# Patient Record
Sex: MALE | Race: WHITE | URBAN - NONMETROPOLITAN AREA
[De-identification: names, ages, dates, MRNs, and addresses within clinical notes are randomized per-mention and may not be internally consistent; named-entity substitution may affect disease eponyms.]

---

## 2021-10-11 ENCOUNTER — IMPORTED ENCOUNTER (OUTPATIENT)
Dept: URBAN - NONMETROPOLITAN AREA CLINIC 1 | Facility: CLINIC | Age: 71
End: 2021-10-11

## 2021-10-11 PROBLEM — H35.363: Noted: 2021-10-11

## 2021-10-11 PROBLEM — H52.4: Noted: 2021-10-11

## 2021-10-11 PROBLEM — H25.13: Noted: 2021-10-11

## 2021-10-11 PROBLEM — H52.03: Noted: 2021-10-11

## 2021-10-11 PROBLEM — H52.223: Noted: 2021-10-11

## 2021-10-11 PROCEDURE — 92015 DETERMINE REFRACTIVE STATE: CPT

## 2021-10-11 PROCEDURE — 92004 COMPRE OPH EXAM NEW PT 1/>: CPT

## 2021-10-11 NOTE — PATIENT DISCUSSION
Compound Hyperopic Astigmatism OU w/Presbyopia -  discussed findings w/patient -  new spectacle Rx issued -  continue to monitor Cataracts OU -  discussed findings w/patient -  discussed signs and symptoms associated -  recommend UV protection -  no treatment indicated at this time -  continue to monitor Drusen OU -  discussed findings w/patient -  discussed signs and symptoms associated -  patient to call or come in ASAP if any changes in vision noted from today -  order mac OCT at next visit -  continue to monitor; Dr's Notes:  10/11/2021DFE 10/11/2021mac OCT

## 2022-04-09 ASSESSMENT — TONOMETRY
OS_IOP_MMHG: 18
OD_IOP_MMHG: 18

## 2022-04-09 ASSESSMENT — VISUAL ACUITY
OU_SC: 20/20
OS_SC: 20/20
OU_CC: 20/20
OD_SC: 20/20

## 2022-10-21 NOTE — PATIENT DISCUSSION
symptoms consistent with fluctuations in BP and BS. Keep FU PCP for eval and tx cardiovascular risk factors.

## 2022-11-29 ENCOUNTER — EMERGENCY VISIT (OUTPATIENT)
Dept: URBAN - NONMETROPOLITAN AREA CLINIC 4 | Facility: CLINIC | Age: 72
End: 2022-11-29

## 2022-11-29 DIAGNOSIS — H00.11: ICD-10-CM

## 2022-11-29 PROCEDURE — 92012 INTRM OPH EXAM EST PATIENT: CPT

## 2022-11-29 ASSESSMENT — VISUAL ACUITY
OS_CC: 20/25
OD_CC: 20/30

## 2022-12-07 ENCOUNTER — FOLLOW UP (OUTPATIENT)
Dept: URBAN - NONMETROPOLITAN AREA CLINIC 4 | Facility: CLINIC | Age: 72
End: 2022-12-07

## 2022-12-07 PROCEDURE — 99214 OFFICE O/P EST MOD 30 MIN: CPT

## 2022-12-07 ASSESSMENT — VISUAL ACUITY
OU_CC: 20/30
OD_CC: 20/40
OS_CC: 20/40